# Patient Record
Sex: FEMALE | Race: WHITE | NOT HISPANIC OR LATINO | Employment: FULL TIME | URBAN - METROPOLITAN AREA
[De-identification: names, ages, dates, MRNs, and addresses within clinical notes are randomized per-mention and may not be internally consistent; named-entity substitution may affect disease eponyms.]

---

## 2018-10-22 ENCOUNTER — OFFICE VISIT (OUTPATIENT)
Dept: NEUROLOGY | Facility: HOSPITAL | Age: 63
End: 2018-10-22
Attending: INTERNAL MEDICINE
Payer: COMMERCIAL

## 2018-10-22 VITALS
TEMPERATURE: 97 F | WEIGHT: 154.44 LBS | DIASTOLIC BLOOD PRESSURE: 83 MMHG | BODY MASS INDEX: 29.16 KG/M2 | HEART RATE: 86 BPM | HEIGHT: 61 IN | SYSTOLIC BLOOD PRESSURE: 127 MMHG

## 2018-10-22 DIAGNOSIS — D50.0 ANEMIA DUE TO CHRONIC BLOOD LOSS: Primary | ICD-10-CM

## 2018-10-22 PROCEDURE — 99205 OFFICE O/P NEW HI 60 MIN: CPT | Performed by: INTERNAL MEDICINE

## 2018-10-22 RX ORDER — IMIPRAMINE HYDROCHLORIDE 10 MG/1
10 TABLET, FILM COATED ORAL NIGHTLY
COMMUNITY
Start: 2018-10-09

## 2018-10-22 RX ORDER — MONTELUKAST SODIUM 10 MG/1
10 TABLET ORAL DAILY
COMMUNITY
Start: 2018-10-08

## 2018-10-22 RX ORDER — MELOXICAM 15 MG/1
15 TABLET ORAL
COMMUNITY
Start: 2018-10-17

## 2018-10-22 RX ORDER — POLYETHYLENE GLYCOL 3350 17 G/17G
17 POWDER, FOR SOLUTION ORAL DAILY
COMMUNITY

## 2018-10-22 RX ORDER — ATORVASTATIN CALCIUM 80 MG/1
80 TABLET, FILM COATED ORAL DAILY
COMMUNITY
Start: 2018-10-08

## 2018-10-22 RX ORDER — INSULIN DEGLUDEC 100 U/ML
80 INJECTION, SOLUTION SUBCUTANEOUS NIGHTLY
COMMUNITY
Start: 2018-10-10

## 2018-10-22 RX ORDER — DICYCLOMINE HYDROCHLORIDE 20 MG/1
20 TABLET ORAL
COMMUNITY
Start: 2018-10-16

## 2018-10-22 RX ORDER — SUCRALFATE 1 G/10ML
SUSPENSION ORAL 2 TIMES DAILY
Status: ON HOLD | COMMUNITY
Start: 2018-08-09 | End: 2018-11-01 | Stop reason: CLARIF

## 2018-10-22 RX ORDER — AMLODIPINE BESYLATE 5 MG/1
5 TABLET ORAL DAILY
COMMUNITY
Start: 2018-10-08

## 2018-10-22 RX ORDER — METFORMIN HYDROCHLORIDE 850 MG/1
850 TABLET ORAL 2 TIMES DAILY WITH MEALS
COMMUNITY
Start: 2018-10-08

## 2018-10-22 RX ORDER — PANTOPRAZOLE SODIUM 40 MG/1
40 TABLET, DELAYED RELEASE ORAL DAILY
COMMUNITY
Start: 2018-10-01

## 2018-10-22 RX ORDER — LINACLOTIDE 145 UG/1
145 CAPSULE, GELATIN COATED ORAL DAILY
COMMUNITY
Start: 2018-10-11

## 2018-10-22 RX ORDER — VENLAFAXINE HYDROCHLORIDE 75 MG/1
75 CAPSULE, EXTENDED RELEASE ORAL DAILY
COMMUNITY
Start: 2018-09-30

## 2018-10-22 RX ORDER — GABAPENTIN 300 MG/1
300 CAPSULE ORAL 3 TIMES DAILY
COMMUNITY
Start: 2018-09-04

## 2018-10-22 RX ORDER — LOSARTAN POTASSIUM AND HYDROCHLOROTHIAZIDE 25; 100 MG/1; MG/1
1 TABLET ORAL DAILY
COMMUNITY
Start: 2018-07-26

## 2018-10-22 RX ORDER — POLYETHYLENE GLYCOL 3350, SODIUM SULFATE, SODIUM CHLORIDE, POTASSIUM CHLORIDE, SODIUM ASCORBATE, AND ASCORBIC ACID 7.5-2.691G
2000 KIT ORAL ONCE
Qty: 2000 ML | Refills: 0 | Status: SHIPPED | OUTPATIENT
Start: 2018-10-22 | End: 2018-10-22

## 2018-10-22 NOTE — PROGRESS NOTES
"[facility]    CC: Anemia    HPI 63 y.o. female pmh Dm2, breast ca s/p radical mastectomy R.Breast, emergent cholecystectomy, hysterectomy (fibroadenoma w/ concern for malig) (1996) and a 16 month history of moderate anemia (requiring 6 IV iron transfusion). Patient states she has been suffering from anemia for at least 1 year but has not required any blood transfusion. Her anemia is associated with severe fatigue and not associated with any signs of overt bleeding such as rectal bleeding or hematemesis. Patient states she does note hematuria at the initiation of urinating which clears up (once a month). She suffers from generalized abdominal pain described as burning not alleviated or aggravated by any factors she can note. She notes close to 30lbs of unintentional weight loss over the last 1-2 months due to dec appetite and NBNB emesis. Her last episode of emesis was 3 weeks prior, and cessation occurred after starting Miralax and Pantoprazole. She still continues to have nausea but denies further episode of emesis. She has a long standing hx of irregular BM, constipation predominant. She notes a recent hx of worsening abdominal pain with diarrhea but unsure if this was related to an illness. She notes being prescribed an antibiotic and then dicyclomine helped around (60-80%). She notes a 3 month hx of NSAID use after an elbow injury a year ago, but notes being told she was anemic prior to the injury. She also has a hx of requiring B12 shots.      Of note in evaluation of her anemia she has an EGD/Colonoscopy (06/2018), VCE (07/2018) which were all negative for any significant findings. CTE had findings of a concern mass vs "inflammatory" lesion in the distal Mid-ileum. Patient notes being referred for further evaluation.     Social History: no MARCO A abuse   Family History: negative for IBD or CRC     Review of Systems  General ROS: negative for chills, fever, weight loss (30 lbs unintentional) " "(  Psychological ROS: negative for hallucination, depression or suicidal ideation  Ophthalmic ROS: negative for blurry vision, photophobia or eye pain  ENT ROS: negative for epistaxis, sore throat or rhinorrhea  Respiratory ROS: no cough, shortness of breath, or wheezing  Cardiovascular ROS: no chest pain or dyspnea on exertion  Gastrointestinal ROS: generalized abdominal pain, no change in bowel habits, or black/ bloody stools  Genito-Urinary ROS: no dysuria, trouble voiding, positive hematuria  Musculoskeletal ROS: negative for gait disturbance or muscular weakness  Neurological ROS: no syncope or seizures; no ataxia  Dermatological ROS: negative for pruritis, rash and jaundice    Physical Examination  /83   Pulse 86   Temp 97.2 °F (36.2 °C) (Oral)   Ht 5' 1" (1.549 m)   Wt 70.1 kg (154 lb 6.9 oz)   BMI 29.18 kg/m²   General appearance: alert, cooperative, no distress  HENT: Normocephalic, atraumatic, neck symmetrical, no nasal discharge   Eyes: conjunctivae/corneas clear, PERRL, EOM's intact  Lungs: clear to auscultation bilaterally, no dullness to percussion bilaterally  Heart: regular rate and rhythm without rub; no displacement of the PMI   Abdomen: soft, TTP RUQ, epigastric region;hyperactive bowel sounds normoactive; no organomegaly, abdominal hernia   Extremities: extremities symmetric; no clubbing, cyanosis, or edema  Integument: Skin color, texture, turgor normal; no rashes; hair distrubution normal  Neurologic: Alert and oriented X 3, normal strength, normal coordination and gait  Psychiatric: no pressured speech; normal affect; no evidence of impaired cognition     Labs:  Lab Results   Component Value Date    WBC 5.87 10/22/2018    HGB 12.1 10/22/2018    HCT 38.7 10/22/2018    MCV 82 10/22/2018     10/22/2018       Imaging:  CTE: potential thickening of the distal ileum   Additional history obtained from      Assessment:    63 y.o. Female with three GI complaints   1. 18 month " history of reported iron deficiency anemia without need for PRBC transfusion but treated with parenteral iron - now resolved; potential diagnoses include celiac disease, atrophic gastritis, chronic GI blood loss due to angioectasias or NSAID enteropathy. VCE normal per report.   2. Chronic irregular bowel habits which are lifelong and sound like IBS but she has a new 18 month history of episodic abdominal pain which is 60% improved with bentyl and also improved with antibiotics for H pylori - perhaps she has now bacterial overgrowth or post infectious IBS   3. Abnormal imaging of the small bowel including thickening of the ileum - significance unclear     Plan:   Additional lab evaluation today including anti-parietal and anti-IF antibodies  Lower DBE with Ink tattoo and biopsy of the ileum   Continue bentyl for abdominal pain and consider a trial of rifaximin in the future then perhaps a trial of elavil   Avoid excessive NSAIDs   If this patient's anemia recurs then she may be considered for a 2nd look video capsule after lower DBE   If this patient's abdominal pain persists or worsens, she may be considered for a 2nd CTE to evaluate for a persistent imaging abnormality of the ileum which may be related to disease of the wall or serosal surface of the bowel     Manjeet Landry MD   [address] [place] [address], [address]   [place] [address] [phone]

## 2018-10-22 NOTE — PATIENT INSTRUCTIONS
Labs today, [facility]       MoviPrep Colonoscopy Prep Instructions  Lower Double Balloon Enteroscopy  [facility]   [address] [place] [address]  [phone]    PROCEDURE DAY/TIME: 11/1/2018 someone will call you the day prior to procedure to give you an arrival time     CLEAR LIQUID DIET (START THE DAY BEFORE PROCEDURE):  Clear Liquid Diet means any liquid from the list below that is not red or purple in color:   Gatorade, Ananda-Aid, Lemonade (Yellow ONLY)-Gatorade is the preferred liquid   Tea (no milk or dairy)   Carbonated beverages (soft drink), regular or diet   Apple juice, white grape juice, white cranberry juice   Jell-O (orange, lemon, or lime flavors ONLY)   Clear, fat-free, beef or chicken broth   Bouillon, clear consommé   Snowball, Popsicles (NOT red or purple)  * No Solid Food or Alcohol   ITEMS TO BE PURCHASED FOR PREP (MoviPrep requires a prescription):         MoviPrep for your local pharmacy   BOWEL PREP INSTRUCTIONS THE DAY BEFORE THE EXAM:  1. Drink only clear liquids (see the above diet) all day. Gatorade is the best liquid.   Drink an extra 8 ounces of clear liquid every hour from 2pm to 5pm.   2. At 5pm, empty one Pouch A and one Pouch B into the disposable container   provided then add water or Gatorade to the top line (fill line). Mix the solution to dissolve. You may wish to mix the solution earlier in the day to refrigerate prior to drinking but the solution must be used within 24 hours of preparation. The MoviPrep container is divided by 4 marks.   3. At 6pm, start drinking the solution. Drink 8 ounces of solution (down to the next   xu) every 15 minutes until the solution is finished. You should drink an extra 16 ounces of any clear liquid with the solution to help the prep work.   THE DAY OF THE EXAM:        1.  Take your morning medications, if any, with a small sip of water.         2.  Beginning 6 hours before  your procedure, empty one Pouch A and one Pouch B   into the disposable container then add water or Gatorade to the top line. Mix the solution and drink 8 ounces every 15 minutes until the solution is finished. Drink an extra 16 ounces of any clear liquid with the solution.              *If your procedure is scheduled for the early morning, you will need to get up in               the middle of the night to take this dose of preparation.         3.  Have nothing to eat or drink for 3 hours before the procedure.         3.  Bring someone to drive you home (you should not drive for 12 hrs after the exam)        4.  Report to Admitting, 1st floor hospital entrance 2 hours before procedure time.   Note: If you are diabetic, do not take insulin or oral medications the morning of the procedure. Take only a half dose of insulin the day before your procedure. Do not take your diabetic pills the day before your procedure

## 2018-10-30 ENCOUNTER — TELEPHONE (OUTPATIENT)
Dept: ENDOSCOPY | Facility: HOSPITAL | Age: 63
End: 2018-10-30

## 2018-10-31 NOTE — TELEPHONE ENCOUNTER
Spoke with patient about arrival time @0700  .     Prep instructions reviewed: the day before the procedure, follow a clear liquid diet all day, then start the first 1/2 of prep at 5pm and take 2nd 1/2 of prep @0100    .  Pt must be completely NPO when prep completed @  0200        .    Medications: Do not take Insulin or oral diabetic medications the day of the procedure.  Take as prescribed: heart, seizure and blood pressure medication in the morning with a sip of water (less than an ounce).  Take any breathing medications and bring inhalers to hospital with you Leave all valuables and jewelry at home.     Wear comfortable clothes to procedure to change into hospital gown You cannot drive for 24 hours after your procedure because you will receive sedation for your procedure to make you comfortable.  A ride must be provided at discharge.

## 2018-11-01 ENCOUNTER — ANESTHESIA EVENT (OUTPATIENT)
Dept: ENDOSCOPY | Facility: HOSPITAL | Age: 63
End: 2018-11-01
Payer: COMMERCIAL

## 2018-11-01 ENCOUNTER — TELEPHONE (OUTPATIENT)
Dept: NEUROLOGY | Facility: HOSPITAL | Age: 63
End: 2018-11-01

## 2018-11-01 ENCOUNTER — ANESTHESIA (OUTPATIENT)
Dept: ENDOSCOPY | Facility: HOSPITAL | Age: 63
End: 2018-11-01
Payer: COMMERCIAL

## 2018-11-01 ENCOUNTER — HOSPITAL ENCOUNTER (OUTPATIENT)
Facility: HOSPITAL | Age: 63
Discharge: HOME | End: 2018-11-01
Attending: INTERNAL MEDICINE | Admitting: INTERNAL MEDICINE
Payer: COMMERCIAL

## 2018-11-01 VITALS
DIASTOLIC BLOOD PRESSURE: 95 MMHG | RESPIRATION RATE: 18 BRPM | SYSTOLIC BLOOD PRESSURE: 132 MMHG | WEIGHT: 150 LBS | HEIGHT: 61 IN | TEMPERATURE: 98 F | BODY MASS INDEX: 28.32 KG/M2 | HEART RATE: 62 BPM | OXYGEN SATURATION: 94 %

## 2018-11-01 DIAGNOSIS — D50.0 ANEMIA DUE TO CHRONIC BLOOD LOSS: ICD-10-CM

## 2018-11-01 DIAGNOSIS — D64.9 ANEMIA, UNSPECIFIED TYPE: Primary | ICD-10-CM

## 2018-11-01 DIAGNOSIS — D64.9 ANEMIA: ICD-10-CM

## 2018-11-01 PROCEDURE — 37000009 HC ANESTHESIA EA ADD 15 MINS: Performed by: INTERNAL MEDICINE

## 2018-11-01 PROCEDURE — 27201238 HC BALLOON, OVERTUBE (ANY): Performed by: INTERNAL MEDICINE

## 2018-11-01 PROCEDURE — 37000008 HC ANESTHESIA 1ST 15 MINUTES: Performed by: INTERNAL MEDICINE

## 2018-11-01 PROCEDURE — 45378 DIAGNOSTIC COLONOSCOPY: CPT | Performed by: INTERNAL MEDICINE

## 2018-11-01 PROCEDURE — 63600175 PHARM REV CODE 636 W HCPCS: Performed by: NURSE ANESTHETIST, CERTIFIED REGISTERED

## 2018-11-01 PROCEDURE — 25000003 PHARM REV CODE 250: Performed by: INTERNAL MEDICINE

## 2018-11-01 PROCEDURE — 44799 UNLISTED PX SMALL INTESTINE: CPT | Performed by: INTERNAL MEDICINE

## 2018-11-01 RX ORDER — SODIUM CHLORIDE 9 MG/ML
INJECTION, SOLUTION INTRAVENOUS CONTINUOUS
Status: DISCONTINUED | OUTPATIENT
Start: 2018-11-01 | End: 2018-11-01 | Stop reason: HOSPADM

## 2018-11-01 RX ORDER — PROPOFOL 10 MG/ML
VIAL (ML) INTRAVENOUS
Status: DISCONTINUED | OUTPATIENT
Start: 2018-11-01 | End: 2018-11-01

## 2018-11-01 RX ORDER — SODIUM CHLORIDE 0.9 % (FLUSH) 0.9 %
3 SYRINGE (ML) INJECTION
Status: DISCONTINUED | OUTPATIENT
Start: 2018-11-01 | End: 2018-11-01 | Stop reason: HOSPADM

## 2018-11-01 RX ORDER — PROPOFOL 10 MG/ML
VIAL (ML) INTRAVENOUS CONTINUOUS PRN
Status: DISCONTINUED | OUTPATIENT
Start: 2018-11-01 | End: 2018-11-01

## 2018-11-01 RX ORDER — LIDOCAINE HCL/PF 100 MG/5ML
SYRINGE (ML) INTRAVENOUS
Status: DISCONTINUED | OUTPATIENT
Start: 2018-11-01 | End: 2018-11-01

## 2018-11-01 RX ADMIN — SODIUM CHLORIDE: 0.9 INJECTION, SOLUTION INTRAVENOUS at 07:11

## 2018-11-01 RX ADMIN — LIDOCAINE HYDROCHLORIDE 75 MG: 20 INJECTION, SOLUTION INTRAVENOUS at 08:11

## 2018-11-01 RX ADMIN — PROPOFOL 150 MCG/KG/MIN: 10 INJECTION, EMULSION INTRAVENOUS at 08:11

## 2018-11-01 RX ADMIN — PROPOFOL 80 MG: 10 INJECTION, EMULSION INTRAVENOUS at 08:11

## 2018-11-01 NOTE — TRANSFER OF CARE
"Anesthesia Transfer of Care Note    Patient: Joseline Palomo    Procedure(s) Performed: Procedure(s) (LRB):  Lower DBE (N/A)    Patient location: GI    Anesthesia Type: MAC    Transport from OR: Transported from OR on room air with adequate spontaneous ventilation    Post pain: adequate analgesia    Post assessment: no apparent anesthetic complications    Post vital signs: stable    Level of consciousness: awake    Nausea/Vomiting: no nausea/vomiting    Complications: none    Transfer of care protocol was followed      Last vitals:   Visit Vitals  BP (!) 156/78   Pulse 76   Temp 36 °C (96.8 °F)   Resp 17   Ht 5' 1" (1.549 m)   Wt 68 kg (150 lb)   SpO2 98%   Breastfeeding? No   BMI 28.34 kg/m²     "

## 2018-11-01 NOTE — TELEPHONE ENCOUNTER
----- Message from Manjeet Landry MD sent at 11/1/2018 11:12 AM CDT -----  Please see if you can get a copy of this patient's CT enterography on a CD as well as her video capsule study on a CD

## 2018-11-01 NOTE — H&P
"CC: Anemia     HPI 63 y.o. female pmh Dm2, breast ca s/p radical mastectomy R.Breast, emergent cholecystectomy, hysterectomy (fibroadenoma w/ concern for malig) (1996) and a 16 month history of moderate anemia (requiring 6 IV iron transfusion). Patient states she has been suffering from anemia for at least 1 year but has not required any blood transfusion. Her anemia is associated with severe fatigue and not associated with any signs of overt bleeding such as rectal bleeding or hematemesis. Patient states she does note hematuria at the initiation of urinating which clears up (once a month). She suffers from generalized abdominal pain described as burning not alleviated or aggravated by any factors she can note. She notes close to 30lbs of unintentional weight loss over the last 1-2 months due to dec appetite and NBNB emesis. Her last episode of emesis was 3 weeks prior, and cessation occurred after starting Miralax and Pantoprazole. She still continues to have nausea but denies further episode of emesis. She has a long standing hx of irregular BM, constipation predominant. She notes a recent hx of worsening abdominal pain with diarrhea but unsure if this was related to an illness. She notes being prescribed an antibiotic and then dicyclomine helped around (60-80%). She notes a 3 month hx of NSAID use after an elbow injury a year ago, but notes being told she was anemic prior to the injury. She also has a hx of requiring B12 shots.       Of note in evaluation of her anemia she has an EGD/Colonoscopy (06/2018), VCE (07/2018) which were all negative for any significant findings. CTE had findings of a concern mass vs "inflammatory" lesion in the distal Mid-ileum. Patient notes being referred for further evaluation.      Social History: no MARCO A abuse   Family History: negative for IBD or CRC      Review of Systems  General ROS: negative for chills, fever, weight loss (30 lbs unintentional) (  Psychological ROS: negative " "for hallucination, depression or suicidal ideation  Ophthalmic ROS: negative for blurry vision, photophobia or eye pain  ENT ROS: negative for epistaxis, sore throat or rhinorrhea  Respiratory ROS: no cough, shortness of breath, or wheezing  Cardiovascular ROS: no chest pain or dyspnea on exertion  Gastrointestinal ROS: generalized abdominal pain, no change in bowel habits, or black/ bloody stools  Genito-Urinary ROS: no dysuria, trouble voiding, positive hematuria  Musculoskeletal ROS: negative for gait disturbance or muscular weakness  Neurological ROS: no syncope or seizures; no ataxia  Dermatological ROS: negative for pruritis, rash and jaundice     Physical Examination  /83   Pulse 86   Temp 97.2 °F (36.2 °C) (Oral)   Ht 5' 1" (1.549 m)   Wt 70.1 kg (154 lb 6.9 oz)   BMI 29.18 kg/m²   General appearance: alert, cooperative, no distress  HENT: Normocephalic, atraumatic, neck symmetrical, no nasal discharge   Eyes: conjunctivae/corneas clear, PERRL, EOM's intact  Lungs: clear to auscultation bilaterally, no dullness to percussion bilaterally  Heart: regular rate and rhythm without rub; no displacement of the PMI   Abdomen: soft, TTP RUQ, epigastric region;hyperactive bowel sounds normoactive; no organomegaly, abdominal hernia   Extremities: extremities symmetric; no clubbing, cyanosis, or edema  Integument: Skin color, texture, turgor normal; no rashes; hair distrubution normal  Neurologic: Alert and oriented X 3, normal strength, normal coordination and gait  Psychiatric: no pressured speech; normal affect; no evidence of impaired cognition      Labs:        Lab Results   Component Value Date     WBC 5.87 10/22/2018     HGB 12.1 10/22/2018     HCT 38.7 10/22/2018     MCV 82 10/22/2018      10/22/2018         Imaging:  CTE: potential thickening of the distal ileum   Additional history obtained from       Assessment:    63 y.o. Female with three GI complaints   1. 18 month history of " reported iron deficiency anemia without need for PRBC transfusion but treated with parenteral iron - now resolved; potential diagnoses include celiac disease, atrophic gastritis, chronic GI blood loss due to angioectasias or NSAID enteropathy. VCE normal per report.   2. Chronic irregular bowel habits which are lifelong and sound like IBS but she has a new 18 month history of episodic abdominal pain which is 60% improved with bentyl and also improved with antibiotics for H pylori - perhaps she has now bacterial overgrowth or post infectious IBS   3. Abnormal imaging of the small bowel including thickening of the ileum - significance unclear      Plan:   Additional lab evaluation today including anti-parietal and anti-IF antibodies  Lower DBE with Ink tattoo and biopsy of the ileum   Continue bentyl for abdominal pain and consider a trial of rifaximin in the future then perhaps a trial of elavil   Avoid excessive NSAIDs   If this patient's anemia recurs then she may be considered for a 2nd look video capsule after lower DBE   If this patient's abdominal pain persists or worsens, she may be considered for a 2nd CTE to evaluate for a persistent imaging abnormality of the ileum which may be related to disease of the wall or serosal surface of the bowel

## 2018-11-01 NOTE — [employer] PATIENT INSTRUCTIONS
Discharge Summary/Instructions after an Endoscopic Procedure  Patient Name: Joseline Palomo  Patient MRN: [id number]  Patient YOB: 1955  Thursday, November 01, 2018  Manjeet Landry MD  RESTRICTIONS:  During your procedure today, you received medications for sedation.  These   medications may affect your judgment, balance and coordination.  Therefore,   for 24 hours, you have the following restrictions:   - DO NOT drive a car, operate machinery, make legal/financial decisions,   sign important papers or drink alcohol.    ACTIVITY:  Today: no heavy lifting, straining or running due to procedural   sedation/anesthesia.  The following day: return to full activity including work.  DIET:  Eat and drink normally unless instructed otherwise.     TREATMENT FOR COMMON SIDE EFFECTS:  - Mild abdominal pain, nausea, belching, bloating or excessive gas:  rest,   eat lightly and use a heating pad.  - Sore Throat: treat with throat lozenges and/or gargle with warm salt   water.  - Because air was used during the procedure, expelling large amounts of air   from your rectum or belching is normal.  - If a bowel prep was taken, you may not have a bowel movement for 1-3 days.    This is normal.  SYMPTOMS TO WATCH FOR AND REPORT TO YOUR PHYSICIAN:  1. Abdominal pain or bloating, other than gas cramps.  2. Chest pain.  3. Back pain.  4. Signs of infection such as: chills or fever occurring within 24 hours   after the procedure.  5. Rectal bleeding, which would show as bright red, maroon, or black stools.   (A tablespoon of blood from the rectum is not serious, especially if   hemorrhoids are present.)  6. Vomiting.  7. Weakness or dizziness.  GO DIRECTLY TO THE NEAREST EMERGENCY ROOM IF YOU HAVE ANY OF THE FOLLOWING:      Difficulty breathing              Chills and/or fever over 101 F   Persistent vomiting and/or vomiting blood   Severe abdominal pain   Severe chest pain   Black, tarry stools   Bleeding- more than one  tablespoon   Any other symptom or condition that you feel may need urgent attention  Your doctor recommends these additional instructions:  If any biopsies were taken, your doctors clinic will contact you in 1 to 2   weeks with any results.  Continue bentyl for abdominal pain and consider a trial of rifaximin in the   future then perhaps a trial of elavil   Avoid excessive NSAIDs   Obtain images from most recent CTE for independent review. Obtain video   capsule study on a CD for independent review. If the findings are this exam   are found to be minor, I will recommend a 2nd CTE to evaluate for a   persistent imaging abnormality of the ileum which may be related to disease   of the wall or serosal surface of the bowel. If 2nd CTE is normal but   anemia recurs, I will recommend a 2nd look video capsule vs long upper DBE     Discharge to home   Condition stable   Resume previous diet   The signs and symptoms of potential delayed complications were discussed   with the patient. If signs or symptoms of these complications develop, call   the [facility] at 1 [phone].   Return to normal activities tomorrow.  Written discharge instructions were   provided to the patient.  For questions, problems or results please call your physician - Manjeet Landry MD at Work:  [phone].  EMERGENCY PHONE NUMBER: [phone],  LAB RESULTS: [phone]  IF A COMPLICATION OR EMERGENCY SITUATION ARISES AND YOU ARE UNABLE TO REACH   YOUR PHYSICIAN - GO DIRECTLY TO THE EMERGENCY ROOM.  MD Manjeet Moya MD  11/1/2018 9:41:15 AM  This report has been verified and signed electronically.  [employer]

## 2018-11-01 NOTE — ANESTHESIA POSTPROCEDURE EVALUATION
"Anesthesia Post Evaluation    Patient: Joseline Palomo    Procedure(s) Performed: Procedure(s) (LRB):  Lower DBE (N/A)    Final Anesthesia Type: MAC  Patient location during evaluation: GI PACU  Patient participation: Yes- Able to Participate  Level of consciousness: awake and alert  Post-procedure vital signs: reviewed and stable  Pain management: adequate  Airway patency: patent  PONV status at discharge: No PONV  Anesthetic complications: no      Cardiovascular status: blood pressure returned to baseline and hemodynamically stable  Respiratory status: unassisted, spontaneous ventilation and room air  Hydration status: euvolemic  Follow-up not needed.        Visit Vitals  BP (!) 156/78   Pulse 76   Temp 36 °C (96.8 °F)   Resp 17   Ht 5' 1" (1.549 m)   Wt 68 kg (150 lb)   SpO2 98%   Breastfeeding? No   BMI 28.34 kg/m²       Pain/Stephenie Score: Pain Assessment Performed: Yes (11/1/2018  7:42 AM)  Presence of Pain: denies (11/1/2018  9:25 AM)        "

## 2018-11-01 NOTE — TELEPHONE ENCOUNTER
Request made of Jaden with Dr. Caleb Flowers with [facility] to send cd of video capsule study and ct enterography cd to clinic.  Clinic address given to Jaden.  Jaden to call clinic is additional information is needed. Jaden repeated clinic address and telephone number correctly.

## 2018-11-01 NOTE — ANESTHESIA PREPROCEDURE EVALUATION
11/01/2018  Joseline Palomo is a 63 y.o., female presents for lower dbe under MAC.    Anesthesia Evaluation    I have reviewed the Patient Summary Reports.    I have reviewed the Nursing Notes.   I have reviewed the Medications.     Review of Systems  Anesthesia Hx:  No problems with previous Anesthesia    Hematology/Oncology:  Hematology Normal        EENT/Dental:EENT/Dental Normal   Cardiovascular:   Hypertension    Pulmonary:  Pulmonary Normal    Neurological:  Neurology Normal    Endocrine:   Diabetes        Physical Exam  General:  Well nourished    Airway/Jaw/Neck:  Airway Findings: Mouth Opening: Normal Tongue: Normal  General Airway Assessment: Adult       Chest/Lungs:  Chest/Lungs Findings: Normal Respiratory Rate, Clear to auscultation     Heart/Vascular:  Heart Findings: Rate: Normal  Rhythm: Regular Rhythm  Sounds: Normal        Mental Status:  Mental Status Findings:  Cooperative, Alert and Oriented         Anesthesia Plan  Type of Anesthesia, risks & benefits discussed:  Anesthesia Type:  MAC  Patient's Preference:   Intra-op Monitoring Plan: standard ASA monitors  Intra-op Monitoring Plan Comments:   Post Op Pain Control Plan: per primary service following discharge from PACU  Post Op Pain Control Plan Comments:   Induction:   IV  Beta Blocker:  Patient is not currently on a Beta-Blocker (No further documentation required).       Informed Consent: Patient understands risks and agrees with Anesthesia plan.  Questions answered. Anesthesia consent signed with patient.  ASA Score: 3     Day of Surgery Review of History & Physical:  There are no significant changes.          Ready For Surgery From Anesthesia Perspective.

## 2018-11-05 ENCOUNTER — TELEPHONE (OUTPATIENT)
Dept: NEUROLOGY | Facility: HOSPITAL | Age: 63
End: 2018-11-05

## 2018-11-05 NOTE — TELEPHONE ENCOUNTER
----- Message from Manjeet Landry MD sent at 11/5/2018  2:45 PM CST -----  Hi Dr. [place],   This patient was referred to me for small bowel enteroscopy due to abnormal imaging by CTE in [place]. The images do not look impressive to me but I am not an expert. Is there a way that I can send you the CD or load it into the radiology system for your review. I expect that you could bill a professional fee for this additional read.   Please let me know.   Pavel Landry   Gastroenterology

## 2018-11-06 LAB — POCT GLUCOSE: 90 MG/DL (ref 70–110)

## 2018-11-20 ENCOUNTER — TELEPHONE (OUTPATIENT)
Dept: NEUROLOGY | Facility: HOSPITAL | Age: 63
End: 2018-11-20

## 2018-11-20 NOTE — TELEPHONE ENCOUNTER
----- Message from Rena Dent sent at 11/20/2018  9:57 AM CST -----  GI- Patient is calling to get the biopsy results and the next steps. Please call back to assist at [phone].

## 2018-11-20 NOTE — TELEPHONE ENCOUNTER
Pt requesting biopsy results from procedure.  Pt notified request made to Dr. Flowers for cd of CTE.  Pt to be notified with receipt of cd and recommendations from Dr. Landry.

## 2018-11-28 ENCOUNTER — TELEPHONE (OUTPATIENT)
Dept: NEUROLOGY | Facility: HOSPITAL | Age: 63
End: 2018-11-28

## 2018-11-28 ENCOUNTER — DOCUMENTATION ONLY (OUTPATIENT)
Dept: NEUROLOGY | Facility: HOSPITAL | Age: 63
End: 2018-11-28

## 2018-11-28 NOTE — PROGRESS NOTES
Video capsule reviewed and is completely normal.   Recommend:   Repeat CTE in [place] - may be able to have this done through [facility]. See lower DBE report for additional recommendations.

## 2018-11-28 NOTE — TELEPHONE ENCOUNTER
Spoke with patient regarding results of VCE.  No new issues and patient overall doing well.  She will follow up with Dr Flowers for repeat CTE.

## 2018-11-29 ENCOUNTER — TELEPHONE (OUTPATIENT)
Dept: NEUROLOGY | Facility: HOSPITAL | Age: 63
End: 2018-11-29

## 2018-11-29 DIAGNOSIS — R93.3 ABNORMAL VIRTUAL COLONOSCOPE: Primary | ICD-10-CM

## 2018-11-29 DIAGNOSIS — R10.84 GENERALIZED ABDOMINAL PAIN: ICD-10-CM

## 2018-11-29 NOTE — TELEPHONE ENCOUNTER
Message placed on [employer].  To notify of ordered ct enterography and lab.  Pt can contact [facility] at [phone].

## 2018-11-29 NOTE — TELEPHONE ENCOUNTER
----- Message from Manjeet Landry MD sent at 11/28/2018  5:07 PM CST -----  Can we get this patient scheduled for CTE at [facility] in [place]?

## 2018-12-03 DIAGNOSIS — R10.84 GENERALIZED ABDOMINAL PAIN: Primary | ICD-10-CM

## 2018-12-04 ENCOUNTER — HOSPITAL ENCOUNTER (OUTPATIENT)
Dept: RADIOLOGY | Facility: HOSPITAL | Age: 63
Discharge: HOME | End: 2018-12-04
Attending: INTERNAL MEDICINE
Payer: COMMERCIAL

## 2018-12-04 DIAGNOSIS — R10.84 GENERALIZED ABDOMINAL PAIN: ICD-10-CM

## 2018-12-04 PROCEDURE — 25500020 PHARM REV CODE 255: Performed by: INTERNAL MEDICINE

## 2018-12-04 PROCEDURE — 74177 CT ABD & PELVIS W/CONTRAST: CPT | Mod: TC

## 2018-12-04 RX ADMIN — IOHEXOL 100 ML: 350 INJECTION, SOLUTION INTRAVENOUS at 09:12

## 2018-12-05 ENCOUNTER — TELEPHONE (OUTPATIENT)
Dept: NEUROLOGY | Facility: HOSPITAL | Age: 63
End: 2018-12-05

## 2018-12-05 DIAGNOSIS — D49.0 SMALL BOWEL TUMOR: Primary | ICD-10-CM

## 2018-12-05 NOTE — TELEPHONE ENCOUNTER
----- Message from Manjeet Landry MD sent at 12/5/2018 10:02 AM CST -----  Please help arrange for DBE exam (upper) on Dec 13th. Patient informed and case request in

## 2018-12-05 NOTE — TELEPHONE ENCOUNTER
----- Message from Kayli Cho sent at 12/5/2018  3:22 PM CST -----  Contact: Patient  GI- -Patient is calling in regards to a procedure appointment  Call back number is [phone].

## 2018-12-05 NOTE — TELEPHONE ENCOUNTER
Pt notified upper dbe scheduled on Thursday, December 13, 2018.  Pt instructed to eat light meals on Wednesday, December 12, 2018 with nothing to eat or drink after midnight.  Pt notified Endoscopy staff will contact her 2 - 3 days prior to procedure with arrival to [facility] 1st floor admit.  Pt repeated all information correctly.

## 2018-12-05 NOTE — TELEPHONE ENCOUNTER
CTE reviewed. 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 The jejunum contains a long intussusception concerning for a tumor in this area. This is concerning for a potential breast cancer metastatic lesion although her last diagnosis of active breast cancer was 8 years ago. There is also a small fluid pocket anterior to the psoas muscle which is also unexplained but could represent a malignant process.   Plan:   Long upper DBE on Thursday, Dec 13th   Like PET-CT before or after this exam

## 2018-12-11 ENCOUNTER — TELEPHONE (OUTPATIENT)
Dept: ENDOSCOPY | Facility: HOSPITAL | Age: 63
End: 2018-12-11

## 2018-12-11 NOTE — TELEPHONE ENCOUNTER
Spoke with patient about arrival time @. 1115  NPO status reviewed: Patient must have nothing to eat after midnight.  Pt may have CLEAR liquids ONLY until completely NPO 3 hrs @ 0815    Medications: Do not take Insulin or oral diabetic medications the day of the procedure.  Take as prescribed: heart, seizure and blood pressure medication in the morning with a sip of water (less than an ounce).  Take any breathing medications and bring inhalers to hospital with you Leave all valuables and jewelry at home.     Wear comfortable clothes to procedure to change into hospital gown You cannot drive for 24 hours after your procedure because you will receive sedation for your procedure to make you comfortable.  A ride must be provided at discharge.

## 2018-12-13 ENCOUNTER — ANESTHESIA EVENT (OUTPATIENT)
Dept: ENDOSCOPY | Facility: HOSPITAL | Age: 63
End: 2018-12-13
Payer: COMMERCIAL

## 2018-12-13 ENCOUNTER — ANESTHESIA (OUTPATIENT)
Dept: ENDOSCOPY | Facility: HOSPITAL | Age: 63
End: 2018-12-13
Payer: COMMERCIAL

## 2018-12-13 ENCOUNTER — HOSPITAL ENCOUNTER (OUTPATIENT)
Facility: HOSPITAL | Age: 63
Discharge: HOME | End: 2018-12-13
Attending: INTERNAL MEDICINE | Admitting: INTERNAL MEDICINE
Payer: COMMERCIAL

## 2018-12-13 VITALS
TEMPERATURE: 98 F | RESPIRATION RATE: 10 BRPM | DIASTOLIC BLOOD PRESSURE: 77 MMHG | HEART RATE: 79 BPM | OXYGEN SATURATION: 95 % | WEIGHT: 152 LBS | HEIGHT: 61 IN | SYSTOLIC BLOOD PRESSURE: 160 MMHG | BODY MASS INDEX: 28.7 KG/M2

## 2018-12-13 DIAGNOSIS — D64.9 ANEMIA: ICD-10-CM

## 2018-12-13 DIAGNOSIS — Z01.818 PREOP EXAMINATION: ICD-10-CM

## 2018-12-13 DIAGNOSIS — D50.0 ANEMIA DUE TO CHRONIC BLOOD LOSS: ICD-10-CM

## 2018-12-13 DIAGNOSIS — D64.9 ANEMIA, UNSPECIFIED TYPE: Primary | ICD-10-CM

## 2018-12-13 PROCEDURE — 27201028 HC NEEDLE, SCLERO: Performed by: INTERNAL MEDICINE

## 2018-12-13 PROCEDURE — 37000009 HC ANESTHESIA EA ADD 15 MINS: Performed by: INTERNAL MEDICINE

## 2018-12-13 PROCEDURE — 37000008 HC ANESTHESIA 1ST 15 MINUTES: Performed by: INTERNAL MEDICINE

## 2018-12-13 PROCEDURE — 25000003 PHARM REV CODE 250: Performed by: INTERNAL MEDICINE

## 2018-12-13 PROCEDURE — 44360 SMALL BOWEL ENDOSCOPY: CPT | Performed by: INTERNAL MEDICINE

## 2018-12-13 PROCEDURE — 27201238 HC BALLOON, OVERTUBE (ANY): Performed by: INTERNAL MEDICINE

## 2018-12-13 PROCEDURE — 44799 UNLISTED PX SMALL INTESTINE: CPT | Performed by: INTERNAL MEDICINE

## 2018-12-13 PROCEDURE — 93010 ELECTROCARDIOGRAM REPORT: CPT | Mod: ,,, | Performed by: INTERNAL MEDICINE

## 2018-12-13 PROCEDURE — 63600175 PHARM REV CODE 636 W HCPCS: Performed by: NURSE ANESTHETIST, CERTIFIED REGISTERED

## 2018-12-13 PROCEDURE — 93005 ELECTROCARDIOGRAM TRACING: CPT

## 2018-12-13 PROCEDURE — 82962 GLUCOSE BLOOD TEST: CPT | Performed by: INTERNAL MEDICINE

## 2018-12-13 RX ORDER — LIDOCAINE HCL/PF 100 MG/5ML
SYRINGE (ML) INTRAVENOUS
Status: DISCONTINUED | OUTPATIENT
Start: 2018-12-13 | End: 2018-12-13

## 2018-12-13 RX ORDER — SODIUM CHLORIDE 0.9 % (FLUSH) 0.9 %
3 SYRINGE (ML) INJECTION
Status: DISCONTINUED | OUTPATIENT
Start: 2018-12-13 | End: 2018-12-13 | Stop reason: HOSPADM

## 2018-12-13 RX ORDER — ONDANSETRON 2 MG/ML
4 INJECTION INTRAMUSCULAR; INTRAVENOUS DAILY PRN
Status: DISCONTINUED | OUTPATIENT
Start: 2018-12-13 | End: 2018-12-13 | Stop reason: SDUPTHER

## 2018-12-13 RX ORDER — ONDANSETRON HYDROCHLORIDE 2 MG/ML
INJECTION, SOLUTION INTRAMUSCULAR; INTRAVENOUS
Status: DISCONTINUED | OUTPATIENT
Start: 2018-12-13 | End: 2018-12-13

## 2018-12-13 RX ORDER — HYDROMORPHONE HYDROCHLORIDE 2 MG/ML
0.5 INJECTION, SOLUTION INTRAMUSCULAR; INTRAVENOUS; SUBCUTANEOUS EVERY 5 MIN PRN
Status: DISCONTINUED | OUTPATIENT
Start: 2018-12-13 | End: 2018-12-13 | Stop reason: HOSPADM

## 2018-12-13 RX ORDER — MIDAZOLAM HYDROCHLORIDE 1 MG/ML
INJECTION INTRAMUSCULAR; INTRAVENOUS
Status: DISCONTINUED | OUTPATIENT
Start: 2018-12-13 | End: 2018-12-13

## 2018-12-13 RX ORDER — DIPHENHYDRAMINE HYDROCHLORIDE 50 MG/ML
25 INJECTION INTRAMUSCULAR; INTRAVENOUS EVERY 6 HOURS PRN
Status: DISCONTINUED | OUTPATIENT
Start: 2018-12-13 | End: 2018-12-13

## 2018-12-13 RX ORDER — PROPOFOL 10 MG/ML
VIAL (ML) INTRAVENOUS
Status: DISCONTINUED | OUTPATIENT
Start: 2018-12-13 | End: 2018-12-13

## 2018-12-13 RX ORDER — ONDANSETRON 2 MG/ML
4 INJECTION INTRAMUSCULAR; INTRAVENOUS DAILY PRN
Status: DISCONTINUED | OUTPATIENT
Start: 2018-12-13 | End: 2018-12-13 | Stop reason: HOSPADM

## 2018-12-13 RX ORDER — SODIUM CHLORIDE 0.9 % (FLUSH) 0.9 %
3 SYRINGE (ML) INJECTION EVERY 8 HOURS
Status: DISCONTINUED | OUTPATIENT
Start: 2018-12-13 | End: 2018-12-13 | Stop reason: HOSPADM

## 2018-12-13 RX ORDER — SUCCINYLCHOLINE CHLORIDE 20 MG/ML
INJECTION INTRAMUSCULAR; INTRAVENOUS
Status: DISCONTINUED | OUTPATIENT
Start: 2018-12-13 | End: 2018-12-13

## 2018-12-13 RX ORDER — FENTANYL CITRATE 50 UG/ML
INJECTION, SOLUTION INTRAMUSCULAR; INTRAVENOUS
Status: DISCONTINUED | OUTPATIENT
Start: 2018-12-13 | End: 2018-12-13

## 2018-12-13 RX ORDER — SODIUM CHLORIDE 9 MG/ML
INJECTION, SOLUTION INTRAVENOUS CONTINUOUS
Status: DISCONTINUED | OUTPATIENT
Start: 2018-12-13 | End: 2018-12-13 | Stop reason: HOSPADM

## 2018-12-13 RX ADMIN — PROPOFOL 200 MG: 10 INJECTION, EMULSION INTRAVENOUS at 01:12

## 2018-12-13 RX ADMIN — LIDOCAINE HYDROCHLORIDE 100 MG: 20 INJECTION, SOLUTION INTRAVENOUS at 01:12

## 2018-12-13 RX ADMIN — SODIUM CHLORIDE: 0.9 INJECTION, SOLUTION INTRAVENOUS at 01:12

## 2018-12-13 RX ADMIN — MIDAZOLAM HYDROCHLORIDE 2 MG: 1 INJECTION, SOLUTION INTRAMUSCULAR; INTRAVENOUS at 01:12

## 2018-12-13 RX ADMIN — SUCCINYLCHOLINE CHLORIDE 120 MG: 20 INJECTION, SOLUTION INTRAMUSCULAR; INTRAVENOUS at 01:12

## 2018-12-13 RX ADMIN — FENTANYL CITRATE 100 MCG: 50 INJECTION, SOLUTION INTRAMUSCULAR; INTRAVENOUS at 01:12

## 2018-12-13 RX ADMIN — ONDANSETRON 4 MG: 2 INJECTION, SOLUTION INTRAMUSCULAR; INTRAVENOUS at 02:12

## 2018-12-13 RX ADMIN — SODIUM CHLORIDE: 0.9 INJECTION, SOLUTION INTRAVENOUS at 11:12

## 2018-12-13 RX ADMIN — PROPOFOL 50 MG: 10 INJECTION, EMULSION INTRAVENOUS at 02:12

## 2018-12-13 NOTE — PLAN OF CARE
Patient has met PACU discharge criteria, VSS, pain well controlled. Family updated by phone. Released from PACU by Dr. Torre

## 2018-12-13 NOTE — H&P
"[facility]    CC: anemia    HPI 63 y.o. female with history of breast cancer presents with chronic, persistent, moderate, iron deficiency anemia with associated fatigue. She had an EGD significant for mild gastritis and normal duodenum and colonoscopy showed mild erythema of the sigmoid colon and rectum. VCE was normal. 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 The jejunum contained a long intussusception concerning for a tumor in this area. This is concerning for a potential breast cancer metastatic lesion.     Previous hospital records reviewed       Past Medical History:   Diagnosis Date    Asthma     Breast cancer     Diabetes mellitus     Hyperlipidemia     Hypertension          Review of Systems  General ROS: positive for fatigue, negative for chills, fever or weight loss  Cardiovascular ROS: no chest pain or dyspnea on exertion  Gastrointestinal ROS: posititive abdominal pain, no change in bowel habits, or black/ bloody stools    Physical Examination  BP (!) 144/81 (Patient Position: Lying)   Pulse 71   Temp 97.9 °F (36.6 °C) (Temporal)   Resp 18   Ht 5' 1" (1.549 m)   Wt 68.9 kg (152 lb)   SpO2 97%   Breastfeeding? 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 The jejunum contained a long intussusception concerning for a tumor in this area. This is concerning for a potential breast cancer metastatic lesion. "     Personally reviewed    Assessment:   63 y.o. 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

## 2018-12-13 NOTE — [employer] PATIENT INSTRUCTIONS
Discharge Summary/Instructions after an Endoscopic Procedure  Patient Name: Joseline Palomo  Patient MRN: [id number]  Patient YOB: 1955  Thursday, December 13, 2018  Manjeet Landry MD  RESTRICTIONS:  During your procedure today, you received medications for sedation.  These   medications may affect your judgment, balance and coordination.  Therefore,   for 24 hours, you have the following restrictions:   - DO NOT drive a car, operate machinery, make legal/financial decisions,   sign important papers or drink alcohol.    ACTIVITY:  Today: no heavy lifting, straining or running due to procedural   sedation/anesthesia.  The following day: return to full activity including work.  DIET:  Eat and drink normally unless instructed otherwise.     TREATMENT FOR COMMON SIDE EFFECTS:  - Mild abdominal pain, nausea, belching, bloating or excessive gas:  rest,   eat lightly and use a heating pad.  - Sore Throat: treat with throat lozenges and/or gargle with warm salt   water.  - Because air was used during the procedure, expelling large amounts of air   from your rectum or belching is normal.  - If a bowel prep was taken, you may not have a bowel movement for 1-3 days.    This is normal.  SYMPTOMS TO WATCH FOR AND REPORT TO YOUR PHYSICIAN:  1. Abdominal pain or bloating, other than gas cramps.  2. Chest pain.  3. Back pain.  4. Signs of infection such as: chills or fever occurring within 24 hours   after the procedure.  5. Rectal bleeding, which would show as bright red, maroon, or black stools.   (A tablespoon of blood from the rectum is not serious, especially if   hemorrhoids are present.)  6. Vomiting.  7. Weakness or dizziness.  GO DIRECTLY TO THE NEAREST EMERGENCY ROOM IF YOU HAVE ANY OF THE FOLLOWING:      Difficulty breathing              Chills and/or fever over 101 F   Persistent vomiting and/or vomiting blood   Severe abdominal pain   Severe chest pain   Black, tarry stools   Bleeding- more than one  tablespoon   Any other symptom or condition that you feel may need urgent attention  Your doctor recommends these additional instructions:  If any biopsies were taken, your doctors clinic will contact you in 1 to 2   weeks with any results.  PET-CT scan of the abdomen as next step   Potential diagnostic laparoscopy as the reproducible abnormalities observed   by CT imaging should be further investigated   Discharge to home   Condition stable   Resume previous diet   The signs and symptoms of potential delayed complications were discussed   with the patient. If signs or symptoms of these complications develop, call   the [facility] at [phone].   Return to normal activities tomorrow.  Written discharge instructions were   provided to the patient.  For questions, problems or results please call your physician - Manjeet Landry MD at Work:  [phone].  EMERGENCY PHONE NUMBER: [phone],  LAB RESULTS: [phone]  IF A COMPLICATION OR EMERGENCY SITUATION ARISES AND YOU ARE UNABLE TO REACH   YOUR PHYSICIAN - GO DIRECTLY TO THE EMERGENCY ROOM.  MD Manjeet Moya MD  12/13/2018 3:03:46 PM  This report has been verified and signed electronically.  [employer]

## 2018-12-13 NOTE — ANESTHESIA PREPROCEDURE EVALUATION
12/13/2018  Joseline Palomo is a 63 y.o., female for upper DBE under GETA.    Past Medical History:   Diagnosis Date    Asthma     Breast cancer     Diabetes mellitus     Hyperlipidemia     Hypertension          Pre-op Assessment    I have reviewed the Patient Summary Reports.     I have reviewed the Nursing Notes.   I have reviewed the Medications.     Review of Systems  Anesthesia Hx:  No problems with previous Anesthesia   Denies Personal Hx of Anesthesia complications.   Social:  Non-Smoker, No Alcohol Use    Hematology/Oncology:         -- Cancer in past history: Breast surgery    Cardiovascular:   Hypertension hyperlipidemia ECG has been reviewed.    Pulmonary:   Asthma    Endocrine:   Diabetes        Physical Exam  General:  Well nourished    Airway/Jaw/Neck:  Airway Findings: Mouth Opening: Normal Tongue: Normal  General Airway Assessment: Adult  Mallampati: II  TM Distance: Normal, at least 6 cm      Dental:  Dental Findings: Periodontal disease, Mild   Chest/Lungs:  Chest/Lungs Findings: Normal Respiratory Rate, Clear to auscultation     Heart/Vascular:  Heart Findings: Rate: Normal  Rhythm: Regular Rhythm  Sounds: Normal        Mental Status:  Mental Status Findings:  Cooperative, Alert and Oriented       Lab Results   Component Value Date    WBC 5.87 10/22/2018    HGB 12.1 10/22/2018    HCT 38.7 10/22/2018     10/22/2018    ALT 31 10/22/2018    AST 24 10/22/2018     10/22/2018    K 3.2 (L) 10/22/2018    CL 98 10/22/2018    CREATININE 0.9 12/04/2018    BUN 10 10/22/2018    CO2 28 10/22/2018   \      Anesthesia Plan  Type of Anesthesia, risks & benefits discussed:  Anesthesia Type:  general  Patient's Preference:   Intra-op Monitoring Plan: standard ASA monitors  Intra-op Monitoring Plan Comments:   Post Op Pain Control Plan: per primary service following discharge from PACU  Post  Op Pain Control Plan Comments:   Induction:   IV  Beta Blocker:  Patient is not currently on a Beta-Blocker (No further documentation required).       Informed Consent: Patient understands risks and agrees with Anesthesia plan.  Questions answered. Anesthesia consent signed with patient.  ASA Score: 3     Day of Surgery Review of History & Physical:  There are no significant changes.          Ready For Surgery From Anesthesia Perspective.

## 2018-12-13 NOTE — TRANSFER OF CARE
"Anesthesia Transfer of Care Note    Patient: Joselien Palomo    Procedure(s) Performed: Procedure(s) (LRB):  Long upper DBE (N/A)    Patient location: PACU    Anesthesia Type: general    Transport from OR: Transported from OR on 100% O2 by closed face mask with adequate spontaneous ventilation    Post pain: adequate analgesia    Post assessment: no apparent anesthetic complications    Post vital signs: stable    Level of consciousness: sedated    Nausea/Vomiting: no nausea/vomiting    Complications: none    Transfer of care protocol was followed      Last vitals:   Visit Vitals  BP (!) 144/81 (Patient Position: Lying)   Pulse 71   Temp 36.6 °C (97.9 °F) (Temporal)   Resp 18   Ht 5' 1" (1.549 m)   Wt 68.9 kg (152 lb)   SpO2 97%   Breastfeeding? No   BMI 28.72 kg/m²     "

## 2018-12-14 LAB — POCT GLUCOSE: 79 MG/DL (ref 70–110)

## 2018-12-14 NOTE — ANESTHESIA POSTPROCEDURE EVALUATION
"Anesthesia Post Evaluation    Patient: Joseline Palomo    Procedure(s) Performed: Procedure(s) (LRB):  Long upper DBE (N/A)    Final Anesthesia Type: general  Patient location during evaluation: PACU  Patient participation: Yes- Able to Participate  Level of consciousness: awake and alert  Post-procedure vital signs: reviewed and stable  Pain management: adequate  Airway patency: patent  PONV status at discharge: No PONV  Anesthetic complications: no      Cardiovascular status: blood pressure returned to baseline and hemodynamically stable  Respiratory status: unassisted, spontaneous ventilation and room air  Hydration status: euvolemic  Follow-up not needed.        Visit Vitals  BP (!) 160/77   Pulse 79   Temp 36.8 °C (98.2 °F)   Resp 10   Ht 5' 1" (1.549 m)   Wt 68.9 kg (152 lb)   SpO2 95%   Breastfeeding? No   BMI 28.72 kg/m²       Pain/Stephenie Score: Stephenie Score: 10 (12/13/2018  4:26 PM)        "

## 2018-12-21 ENCOUNTER — TELEPHONE (OUTPATIENT)
Dept: NEUROLOGY | Facility: HOSPITAL | Age: 63
End: 2018-12-21

## 2018-12-21 DIAGNOSIS — R93.5 ABNORMAL ABDOMINAL CT SCAN: Primary | ICD-10-CM

## 2018-12-21 NOTE — TELEPHONE ENCOUNTER
Pt request if pet ct scan orders placed by Dr. Landry.  Pt notified orders to be placed on today as per Dr. Landry' recommendations.

## 2018-12-31 ENCOUNTER — TELEPHONE (OUTPATIENT)
Dept: RADIOLOGY | Facility: HOSPITAL | Age: 63
End: 2018-12-31

## 2019-01-02 ENCOUNTER — TELEPHONE (OUTPATIENT)
Dept: RADIOLOGY | Facility: HOSPITAL | Age: 64
End: 2019-01-02

## 2019-01-03 ENCOUNTER — HOSPITAL ENCOUNTER (OUTPATIENT)
Dept: RADIOLOGY | Facility: HOSPITAL | Age: 64
Discharge: HOME | End: 2019-01-03
Attending: INTERNAL MEDICINE
Payer: COMMERCIAL

## 2019-01-03 DIAGNOSIS — R93.5 ABNORMAL ABDOMINAL CT SCAN: ICD-10-CM

## 2019-01-03 PROCEDURE — 78815 NM PET CT ROUTINE: ICD-10-PCS | Mod: 26,PI,, | Performed by: RADIOLOGY

## 2019-01-03 PROCEDURE — 78815 PET IMAGE W/CT SKULL-THIGH: CPT | Mod: 26,PI,, | Performed by: RADIOLOGY

## 2019-01-03 PROCEDURE — A9552 F18 FDG: HCPCS

## 2019-01-03 PROCEDURE — 78815 PET IMAGE W/CT SKULL-THIGH: CPT | Mod: TC

## 2019-01-07 ENCOUNTER — TELEPHONE (OUTPATIENT)
Dept: PEDIATRICS UNIT | Facility: CLINIC | Age: 64
End: 2019-01-07

## 2019-01-07 ENCOUNTER — TELEPHONE (OUTPATIENT)
Dept: NEUROLOGY | Facility: HOSPITAL | Age: 64
End: 2019-01-07

## 2019-01-07 NOTE — TELEPHONE ENCOUNTER
----- Message from Kayli Cho sent at 1/7/2019 11:49 AM CST -----  Contact: Patient  GI- Patient is returning a missed call from the doctor. Call back number is [phone].

## 2019-01-07 NOTE — TELEPHONE ENCOUNTER
Patient called to report PET/CT is normal. No answer on any of the phone numbers in the chart - message left to call back to discuss

## 2019-01-08 ENCOUNTER — TELEPHONE (OUTPATIENT)
Dept: NEUROLOGY | Facility: HOSPITAL | Age: 64
End: 2019-01-08

## 2019-01-08 NOTE — TELEPHONE ENCOUNTER
Pt notified PET/CT is normal.  Pt would like to discuss future plan.  Pt notified Dr. Landry will contact.

## 2019-01-08 NOTE — TELEPHONE ENCOUNTER
----- Message from Fartun Ledezma sent at 1/8/2019  9:38 AM CST -----  Pt was returning phone call from Dr Cannon. Pt can be reached at [phone](h) or [phone](W)

## 2019-01-09 ENCOUNTER — TELEPHONE (OUTPATIENT)
Dept: NEUROLOGY | Facility: HOSPITAL | Age: 64
End: 2019-01-09

## 2019-01-10 NOTE — TELEPHONE ENCOUNTER
Patient called with PET/CT results which are surprisingly negative for pathology. I still recommend that laparoscopy be considered to evaluate for internal hernia or disease outside of the bowel lumen as the cause for recurrent abdominal pain with intussusception. Patient with make an inquiry with Dr. Flowers. May consider referring to our surgeons here if no progress is made

## 2019-10-01 ENCOUNTER — CLINICAL SUPPORT (OUTPATIENT)
Dept: OTHER | Facility: CLINIC | Age: 64
End: 2019-10-01
Payer: COMMERCIAL

## 2019-10-01 DIAGNOSIS — Z00.8 ENCOUNTER FOR OTHER GENERAL EXAMINATION: ICD-10-CM

## 2019-10-01 PROCEDURE — 80061 LIPID PANEL: CPT | Mod: QW,S$GLB,, | Performed by: INTERNAL MEDICINE

## 2019-10-01 PROCEDURE — 99401 PREV MED CNSL INDIV APPRX 15: CPT | Mod: S$GLB,,, | Performed by: INTERNAL MEDICINE

## 2019-10-01 PROCEDURE — 82947 ASSAY GLUCOSE BLOOD QUANT: CPT | Mod: QW,S$GLB,, | Performed by: INTERNAL MEDICINE

## 2019-10-01 PROCEDURE — 99401 PR PREVENT COUNSEL,INDIV,15 MIN: ICD-10-PCS | Mod: S$GLB,,, | Performed by: INTERNAL MEDICINE

## 2019-10-01 PROCEDURE — 82947 PR  ASSAY QUANTITATIVE,BLOOD GLUCOSE: ICD-10-PCS | Mod: QW,S$GLB,, | Performed by: INTERNAL MEDICINE

## 2019-10-01 PROCEDURE — 80061 PR  LIPID PANEL: ICD-10-PCS | Mod: QW,S$GLB,, | Performed by: INTERNAL MEDICINE

## 2019-10-02 VITALS — BODY MASS INDEX: 28.72 KG/M2 | HEIGHT: 61 IN

## 2019-10-02 LAB
HBA1C MFR BLD: 9.5 %
HDLC SERPL-MCNC: 26 MG/DL
POC CHOLESTEROL, LDL (DOCK): 85 MG/DL
POC CHOLESTEROL, TOTAL: 145 MG/DL
POC GLUCOSE, FASTING: 150 MG/DL (ref 60–110)
TRIGL SERPL-MCNC: 172 MG/DL